# Patient Record
Sex: FEMALE | Race: WHITE | NOT HISPANIC OR LATINO | Employment: UNEMPLOYED | URBAN - METROPOLITAN AREA
[De-identification: names, ages, dates, MRNs, and addresses within clinical notes are randomized per-mention and may not be internally consistent; named-entity substitution may affect disease eponyms.]

---

## 2024-08-01 ENCOUNTER — HOSPITAL ENCOUNTER (EMERGENCY)
Facility: HOSPITAL | Age: 44
Discharge: HOME | End: 2024-08-01
Payer: MEDICAID

## 2024-08-01 VITALS
HEART RATE: 87 BPM | RESPIRATION RATE: 15 BRPM | HEIGHT: 64 IN | DIASTOLIC BLOOD PRESSURE: 88 MMHG | BODY MASS INDEX: 23.56 KG/M2 | OXYGEN SATURATION: 100 % | TEMPERATURE: 98.7 F | WEIGHT: 138 LBS | SYSTOLIC BLOOD PRESSURE: 132 MMHG

## 2024-08-01 DIAGNOSIS — K02.9 PAIN DUE TO DENTAL CARIES: ICD-10-CM

## 2024-08-01 DIAGNOSIS — K04.7 DENTAL ABSCESS: Primary | ICD-10-CM

## 2024-08-01 PROCEDURE — 99283 EMERGENCY DEPT VISIT LOW MDM: CPT

## 2024-08-01 PROCEDURE — 2500000001 HC RX 250 WO HCPCS SELF ADMINISTERED DRUGS (ALT 637 FOR MEDICARE OP)

## 2024-08-01 RX ORDER — HYDROCODONE BITARTRATE AND ACETAMINOPHEN 5; 325 MG/1; MG/1
1 TABLET ORAL EVERY 6 HOURS PRN
Qty: 10 TABLET | Refills: 0 | Status: SHIPPED | OUTPATIENT
Start: 2024-08-01

## 2024-08-01 RX ORDER — HYDROCODONE BITARTRATE AND ACETAMINOPHEN 10; 325 MG/1; MG/1
1 TABLET ORAL ONCE
Status: COMPLETED | OUTPATIENT
Start: 2024-08-01 | End: 2024-08-01

## 2024-08-01 RX ADMIN — HYDROCODONE BITARTRATE AND ACETAMINOPHEN 1 TABLET: 10; 325 TABLET ORAL at 18:08

## 2024-08-01 ASSESSMENT — PAIN - FUNCTIONAL ASSESSMENT: PAIN_FUNCTIONAL_ASSESSMENT: 0-10

## 2024-08-01 ASSESSMENT — COLUMBIA-SUICIDE SEVERITY RATING SCALE - C-SSRS
6. HAVE YOU EVER DONE ANYTHING, STARTED TO DO ANYTHING, OR PREPARED TO DO ANYTHING TO END YOUR LIFE?: NO
1. IN THE PAST MONTH, HAVE YOU WISHED YOU WERE DEAD OR WISHED YOU COULD GO TO SLEEP AND NOT WAKE UP?: NO
2. HAVE YOU ACTUALLY HAD ANY THOUGHTS OF KILLING YOURSELF?: NO

## 2024-08-01 ASSESSMENT — PAIN DESCRIPTION - PAIN TYPE: TYPE: ACUTE PAIN

## 2024-08-01 ASSESSMENT — PAIN SCALES - GENERAL
PAINLEVEL_OUTOF10: 7
PAINLEVEL_OUTOF10: 7

## 2024-08-01 NOTE — ED TRIAGE NOTES
Patient was seen at urgent care yesterday for a infected tooth and started clindamycin.  Patient woke up with worsening symptoms (swelling)     Anesthesia Volume In Cc: 4.1

## 2024-08-01 NOTE — DISCHARGE INSTRUCTIONS
It is important that you follow-up with your oral surgeon for definitive care, please finish all antibiotics even if symptoms improve

## 2024-08-01 NOTE — ED PROVIDER NOTES
Chief Complaint   Patient presents with    Dental Pain       44-year-old female arrives to the emergency department with a chief complaint of a right upper facial swelling secondary to dental abscess.  The patient was seen in the urgent care yesterday, started on clindamycin and an appropriate dose, patient has been taking less than 24 hours of clindamycin, stating that the swelling and the pain has intensified.  The patient has no identifiable drainable abscess on initial assessment, patient has multiple dental caries that appear to be chronic as well as her gumline deteriorating.  Patient is working closely with an oral surgeon, has had multiple root canals that have failed, and currently oral surgeon was scheduled for today and would not see the patient's secondary to the facial swelling and dental abscess.  Patient denies any other symptoms or complaints, patient writhing in obvious discomfort      History provided by:  Patient   used: No         PmHx, PsHx, Allergies, Family Hx, social Hx reviewed as documented    Given the focused nature of the complaint, only related components review of systems were evaluated, and abnormalities indicated in HPI    Physical Exam:    General: Patient in obvious discomfort, is AAOx3, appears well developed, well nourished, is a good historian, answers questions appropriately    HEENT: Multiple dental caries, broken teeth, eroded gumline that all appear to be chronic.  Otherwise head normocephalic, atraumatic, PERRLA, EOMs intact, oropharynx without erythema or exudate, buccal mucosa intact without lesions, TMs unremarkable, nose is patent bilateral    Pulmonary: CTAB, no accessory muscle use, able to speak full clear sentences    Cardiac: HRRR, no murmurs, rubs or gallops    GI: soft, non-tender, non-distended    Musculoskeletal: full weight bearing, HERNANDEZ, no joint effusions, clubbing or edema noted    Skin: intact, no lesions or rashes noted, turgor is  good.    Medical Decision Making  This patient was seen in the emergency department with an attending physician available at all times throughout their ED course    Primary consideration for the patient is control of her pain secondary to her dental abscess of her multiple chronic dental caries and conditions that she is currently seeking definitive care for.  Patient started on clindamycin 300 mg 4 times a day, which is an appropriate dose and an appropriate medication given the patient's presentation.  Patient given Vicodin here in the emergency department for her obvious pain, as well as a's short prescription of Vicodin for breakthrough pain, patient will follow-up with her oral surgeon and understands the need for definitive care.  Given the patient's chronic nature and presentation, it is unlikely for a deep tissue abscess, patient has no swelling in the neck bilaterally, though considered no imaging will be done at this time.  Patient is hemodynamically stable upon arrival.  Through shared decision making no diagnostic blood work will be used to further evaluate for systemic infection    Patient is amenable to the plan of discharge as outlined above, all patient's questions pertaining to their ED course were answered in their entirety.  Strict return precautions were discussed with the patient and they verbalized understanding.  Further, it was made clear to the patient that from an emergent basis, all effort and testing was done to eliminate any imminent dangerous or potentially dangerous conditions of the patient however if their symptoms get much worse or feel life-threatening, they are to return to the emergency department or call 911 immediately.       Diagnoses as of 08/01/24 1807   Dental abscess   Pain due to dental caries       The patient has had the following imaging during this ER visit: None     Patient History   Past Medical History:   Diagnosis Date    Chronic or unspecified gastric ulcer with  "perforation (Multi) 2017    Perforated gastric ulcer    Personal history of pneumonia (recurrent)     History of pneumonia     Past Surgical History:   Procedure Laterality Date    CT ABDOMEN PELVIS ANGIOGRAM W AND/OR WO IV CONTRAST  2022    CT ABDOMEN PELVIS ANGIOGRAM W AND/OR WO IV CONTRAST 2022 [facility] CLINICAL LEGACY    OTHER SURGICAL HISTORY  2020    Knee surgery    OTHER SURGICAL HISTORY  2020     section    US GUIDED ABSCESS DRAIN  2017    US GUIDED ABSCESS DRAIN 2017 [facility] CLINICAL LEGACY    US GUIDED TRANSVAGINAL TRANSRECTAL FLUID DRAIN  2017    US GUIDED TRANSVAGINAL TRANSRECTAL FLUID DRAIN 2017 [facility] CLINICAL LEGACY     No family history on file.  Social History     Tobacco Use    Smoking status: Not on file    Smokeless tobacco: Not on file   Substance Use Topics    Alcohol use: Not on file    Drug use: Not on file       ED Triage Vitals [24 1627]   Temperature Heart Rate Respirations BP   37.1 °C (98.7 °F) 94 16 (!) 138/94      Pulse Ox Temp src Heart Rate Source Patient Position   100 % -- -- --      BP Location FiO2 (%)     -- 21 %       Vitals:    24 1627   BP: (!) 138/94   Pulse: 94   Resp: 16   Temp: 37.1 °C (98.7 °F)   SpO2: 100%   Weight: 62.6 kg (138 lb)   Height: 1.626 m (5' 4\")               JOVANNA Ramirez-CNP  24 1807    "

## 2024-09-18 ENCOUNTER — HOSPITAL ENCOUNTER (EMERGENCY)
Facility: HOSPITAL | Age: 44
Discharge: HOME | End: 2024-09-18
Payer: MEDICAID

## 2024-09-18 VITALS
TEMPERATURE: 97.7 F | RESPIRATION RATE: 16 BRPM | BODY MASS INDEX: 25.27 KG/M2 | WEIGHT: 148 LBS | HEIGHT: 64 IN | SYSTOLIC BLOOD PRESSURE: 120 MMHG | DIASTOLIC BLOOD PRESSURE: 88 MMHG | HEART RATE: 89 BPM | OXYGEN SATURATION: 99 %

## 2024-09-18 DIAGNOSIS — K04.7 DENTAL INFECTION: Primary | ICD-10-CM

## 2024-09-18 LAB
ALBUMIN SERPL BCP-MCNC: 4.2 G/DL (ref 3.4–5)
ALP SERPL-CCNC: 100 U/L (ref 33–110)
ALT SERPL W P-5'-P-CCNC: 8 U/L (ref 7–45)
ANION GAP SERPL CALC-SCNC: 13 MMOL/L (ref 10–20)
AST SERPL W P-5'-P-CCNC: 13 U/L (ref 9–39)
BASOPHILS # BLD AUTO: 0.05 X10*3/UL (ref 0–0.1)
BASOPHILS NFR BLD AUTO: 0.6 %
BILIRUB SERPL-MCNC: 0.2 MG/DL (ref 0–1.2)
BUN SERPL-MCNC: 14 MG/DL (ref 6–23)
CALCIUM SERPL-MCNC: 8.8 MG/DL (ref 8.6–10.3)
CHLORIDE SERPL-SCNC: 106 MMOL/L (ref 98–107)
CO2 SERPL-SCNC: 22 MMOL/L (ref 21–32)
CREAT SERPL-MCNC: 0.76 MG/DL (ref 0.5–1.05)
EGFRCR SERPLBLD CKD-EPI 2021: >90 ML/MIN/1.73M*2
EOSINOPHIL # BLD AUTO: 0.05 X10*3/UL (ref 0–0.7)
EOSINOPHIL NFR BLD AUTO: 0.6 %
ERYTHROCYTE [DISTWIDTH] IN BLOOD BY AUTOMATED COUNT: 15 % (ref 11.5–14.5)
GLUCOSE SERPL-MCNC: 114 MG/DL (ref 74–99)
HCT VFR BLD AUTO: 38.1 % (ref 36–46)
HGB BLD-MCNC: 12.2 G/DL (ref 12–16)
IMM GRANULOCYTES # BLD AUTO: 0.01 X10*3/UL (ref 0–0.7)
IMM GRANULOCYTES NFR BLD AUTO: 0.1 % (ref 0–0.9)
LACTATE SERPL-SCNC: 0.8 MMOL/L (ref 0.4–2)
LYMPHOCYTES # BLD AUTO: 2.14 X10*3/UL (ref 1.2–4.8)
LYMPHOCYTES NFR BLD AUTO: 24.5 %
MCH RBC QN AUTO: 26.9 PG (ref 26–34)
MCHC RBC AUTO-ENTMCNC: 32 G/DL (ref 32–36)
MCV RBC AUTO: 84 FL (ref 80–100)
MONOCYTES # BLD AUTO: 0.72 X10*3/UL (ref 0.1–1)
MONOCYTES NFR BLD AUTO: 8.2 %
NEUTROPHILS # BLD AUTO: 5.76 X10*3/UL (ref 1.2–7.7)
NEUTROPHILS NFR BLD AUTO: 66 %
NRBC BLD-RTO: 0 /100 WBCS (ref 0–0)
PLATELET # BLD AUTO: 394 X10*3/UL (ref 150–450)
POTASSIUM SERPL-SCNC: 3.6 MMOL/L (ref 3.5–5.3)
PROT SERPL-MCNC: 7.2 G/DL (ref 6.4–8.2)
RBC # BLD AUTO: 4.54 X10*6/UL (ref 4–5.2)
SODIUM SERPL-SCNC: 137 MMOL/L (ref 136–145)
WBC # BLD AUTO: 8.7 X10*3/UL (ref 4.4–11.3)

## 2024-09-18 PROCEDURE — 36415 COLL VENOUS BLD VENIPUNCTURE: CPT | Performed by: NURSE PRACTITIONER

## 2024-09-18 PROCEDURE — 96375 TX/PRO/DX INJ NEW DRUG ADDON: CPT

## 2024-09-18 PROCEDURE — 96365 THER/PROPH/DIAG IV INF INIT: CPT

## 2024-09-18 PROCEDURE — 83605 ASSAY OF LACTIC ACID: CPT | Performed by: NURSE PRACTITIONER

## 2024-09-18 PROCEDURE — 84075 ASSAY ALKALINE PHOSPHATASE: CPT | Performed by: NURSE PRACTITIONER

## 2024-09-18 PROCEDURE — 99284 EMERGENCY DEPT VISIT MOD MDM: CPT | Mod: 25

## 2024-09-18 PROCEDURE — 2500000004 HC RX 250 GENERAL PHARMACY W/ HCPCS (ALT 636 FOR OP/ED): Performed by: NURSE PRACTITIONER

## 2024-09-18 PROCEDURE — 96367 TX/PROPH/DG ADDL SEQ IV INF: CPT

## 2024-09-18 PROCEDURE — 85025 COMPLETE CBC W/AUTO DIFF WBC: CPT | Performed by: NURSE PRACTITIONER

## 2024-09-18 PROCEDURE — 96366 THER/PROPH/DIAG IV INF ADDON: CPT

## 2024-09-18 PROCEDURE — 2500000001 HC RX 250 WO HCPCS SELF ADMINISTERED DRUGS (ALT 637 FOR MEDICARE OP): Performed by: NURSE PRACTITIONER

## 2024-09-18 RX ORDER — ONDANSETRON HYDROCHLORIDE 2 MG/ML
4 INJECTION, SOLUTION INTRAVENOUS ONCE
Status: COMPLETED | OUTPATIENT
Start: 2024-09-18 | End: 2024-09-18

## 2024-09-18 RX ORDER — DOXYCYCLINE 100 MG/1
100 CAPSULE ORAL 2 TIMES DAILY
Qty: 20 CAPSULE | Refills: 0 | Status: SHIPPED | OUTPATIENT
Start: 2024-09-18 | End: 2024-09-28

## 2024-09-18 RX ORDER — METRONIDAZOLE 500 MG/100ML
500 INJECTION, SOLUTION INTRAVENOUS ONCE
Status: COMPLETED | OUTPATIENT
Start: 2024-09-18 | End: 2024-09-18

## 2024-09-18 RX ORDER — TRAMADOL HYDROCHLORIDE 50 MG/1
50 TABLET ORAL EVERY 6 HOURS PRN
Qty: 12 TABLET | Refills: 0 | Status: SHIPPED | OUTPATIENT
Start: 2024-09-18 | End: 2024-09-21

## 2024-09-18 RX ORDER — CEFTRIAXONE 1 G/50ML
1 INJECTION, SOLUTION INTRAVENOUS ONCE
Status: COMPLETED | OUTPATIENT
Start: 2024-09-18 | End: 2024-09-18

## 2024-09-18 RX ORDER — TRAMADOL HYDROCHLORIDE 50 MG/1
50 TABLET ORAL EVERY 6 HOURS PRN
Status: DISCONTINUED | OUTPATIENT
Start: 2024-09-18 | End: 2024-09-19 | Stop reason: HOSPADM

## 2024-09-18 RX ORDER — MORPHINE SULFATE 2 MG/ML
2 INJECTION, SOLUTION INTRAMUSCULAR; INTRAVENOUS ONCE
Status: COMPLETED | OUTPATIENT
Start: 2024-09-18 | End: 2024-09-18

## 2024-09-18 ASSESSMENT — PAIN DESCRIPTION - ORIENTATION: ORIENTATION: RIGHT

## 2024-09-18 ASSESSMENT — PAIN SCALES - GENERAL
PAINLEVEL_OUTOF10: 7
PAINLEVEL_OUTOF10: 6
PAINLEVEL_OUTOF10: 8

## 2024-09-18 ASSESSMENT — PAIN DESCRIPTION - PAIN TYPE: TYPE: ACUTE PAIN

## 2024-09-18 ASSESSMENT — LIFESTYLE VARIABLES
HAVE YOU EVER FELT YOU SHOULD CUT DOWN ON YOUR DRINKING: NO
TOTAL SCORE: 0
EVER FELT BAD OR GUILTY ABOUT YOUR DRINKING: NO
EVER HAD A DRINK FIRST THING IN THE MORNING TO STEADY YOUR NERVES TO GET RID OF A HANGOVER: NO
HAVE PEOPLE ANNOYED YOU BY CRITICIZING YOUR DRINKING: NO

## 2024-09-18 ASSESSMENT — COLUMBIA-SUICIDE SEVERITY RATING SCALE - C-SSRS
1. IN THE PAST MONTH, HAVE YOU WISHED YOU WERE DEAD OR WISHED YOU COULD GO TO SLEEP AND NOT WAKE UP?: NO
2. HAVE YOU ACTUALLY HAD ANY THOUGHTS OF KILLING YOURSELF?: NO
6. HAVE YOU EVER DONE ANYTHING, STARTED TO DO ANYTHING, OR PREPARED TO DO ANYTHING TO END YOUR LIFE?: NO

## 2024-09-18 ASSESSMENT — PAIN - FUNCTIONAL ASSESSMENT: PAIN_FUNCTIONAL_ASSESSMENT: 0-10

## 2024-09-18 ASSESSMENT — PAIN DESCRIPTION - LOCATION: LOCATION: MOUTH

## 2024-09-18 ASSESSMENT — PAIN DESCRIPTION - FREQUENCY: FREQUENCY: CONSTANT/CONTINUOUS

## 2024-09-18 NOTE — ED PROVIDER NOTES
HPI   Chief Complaint   Patient presents with    Dental Pain     Patient has been seen for a dental abscess, antibiotic ended yesterday, and today the swelling has increased.        This is a 44-year-old  female presenting to the emergency room with complaints of a dental abscess.  The patient has been having problem with her teeth for years.  She was supposed to get her upper teeth pulled COVID last year but it was postponed because she had to have abdominal surgery.  The patient reports that she has been experiencing pain intermittently in the right upper jaw.  She developed swelling that has gotten progressively worse over the last couple of days.  The patient was seen by the dentist today and she was referred her to oral surgeon.  The patient was sent into the emergency room for IV.  She denies any fevers, chills, nausea, or vomiting.  She states that the swelling is located throughout the cheek and around the eye.  She denies any visual changes or eye trauma.      History provided by:  Patient   used: No            Patient History   Past Medical History:   Diagnosis Date    Chronic or unspecified gastric ulcer with perforation (Multi) 2017    Perforated gastric ulcer    Personal history of pneumonia (recurrent)     History of pneumonia     Past Surgical History:   Procedure Laterality Date    CT ABDOMEN PELVIS ANGIOGRAM W AND/OR WO IV CONTRAST  2022    CT ABDOMEN PELVIS ANGIOGRAM W AND/OR WO IV CONTRAST 2022 [facility] CLINICAL LEGACY    OTHER SURGICAL HISTORY  2020    Knee surgery    OTHER SURGICAL HISTORY  2020     section    US GUIDED ABSCESS DRAIN  2017    US GUIDED ABSCESS DRAIN 2017 [facility] CLINICAL LEGACY    US GUIDED TRANSVAGINAL TRANSRECTAL FLUID DRAIN  2017    US GUIDED TRANSVAGINAL TRANSRECTAL FLUID DRAIN 2017 [facility] CLINICAL LEGACY     No family history on file.  Social History     Tobacco Use    Smoking status: Former      Current packs/day: 0.00     Types: Cigarettes     Quit date:      Years since quittin.7    Smokeless tobacco: Never   Substance Use Topics    Alcohol use: Not Currently    Drug use: Never       Physical Exam   ED Triage Vitals [24 1620]   Temperature Heart Rate Respirations BP   36.5 °C (97.7 °F) (!) 102 16 118/88      Pulse Ox Temp Source Heart Rate Source Patient Position   99 % Tympanic Monitor Sitting      BP Location FiO2 (%)     Left arm --       Physical Exam  Vitals and nursing note reviewed.   HENT:      Head: Normocephalic.      Right Ear: Tympanic membrane and external ear normal.      Left Ear: Tympanic membrane and external ear normal.      Nose: Nose normal.      Mouth/Throat:      Mouth: Mucous membranes are moist.      Pharynx: Oropharynx is clear.      Comments: Patient has very reported dentition.  She has multiple eroded decayed teeth noted to the upper right jawline which is tender to palpation.  Patient has diffuse swelling throughout the right jaw and the lower periorbital region.  No signs of periorbital cellulitis.  Eyes:      Conjunctiva/sclera: Conjunctivae normal.      Pupils: Pupils are equal, round, and reactive to light.   Cardiovascular:      Rate and Rhythm: Normal rate and regular rhythm.      Pulses: Normal pulses.      Heart sounds: Normal heart sounds.   Pulmonary:      Effort: Pulmonary effort is normal.      Breath sounds: Normal breath sounds.   Abdominal:      General: Bowel sounds are normal.      Palpations: Abdomen is soft.   Musculoskeletal:         General: Normal range of motion.      Cervical back: Normal range of motion.   Lymphadenopathy:      Cervical: No cervical adenopathy.   Skin:     General: Skin is warm.      Capillary Refill: Capillary refill takes less than 2 seconds.   Neurological:      General: No focal deficit present.      Mental Status: She is alert.   Psychiatric:         Mood and Affect: Mood normal.           ED Course & MDM   Diagnoses  as of 09/18/24 2152   Dental infection                 No data recorded     [place] Coma Scale Score: 15 (09/18/24 1620 : Germán Lopez, EMT)                           Medical Decision Making  The patient was seen and evaluated by the nurse practitioner, Kelli Fu.  The patient is presenting to the emergency room with complaints of a dental infection and right facial swelling.  On examination the patient has swelling noted to the right side of the face but does not have any evidence of a cellulitic process.  A saline lock was established.  Laboratory studies were drawn with results as noted.  The patient does not have any acute leukocytosis or lactic acidosis.  And the patient was administered ceftriaxone and Flagyl for antibiotic coverage.  She was administered tramadol 50 mg p.o. for pain.  She reported improvement of pain symptoms but still had significant pain she was further medicated with morphine 2 mg IVP and Zofran 4 mg IVP.  The patient was placed on doxycycline for antibiotic coverage at home.  She was provided prescription for Ultram for pain.  She was given a list of area dental clinics for follow-up and definitive care.  The patient was instructed to return if she has any worsening symptomatology after the next 2 to 3 days.  The patient was discharged in stable condition with computer discharge instructions given.  Patient verbalized understanding of discharge planning.        Procedure  Procedures     Kelli Fu, JOVANNA-YFN  09/18/24 2154

## 2025-03-15 ENCOUNTER — OFFICE VISIT (OUTPATIENT)
Dept: URGENT CARE | Age: 45
End: 2025-03-15
Payer: MEDICAID

## 2025-03-15 VITALS
OXYGEN SATURATION: 98 % | HEART RATE: 111 BPM | RESPIRATION RATE: 16 BRPM | TEMPERATURE: 98.9 F | SYSTOLIC BLOOD PRESSURE: 128 MMHG | DIASTOLIC BLOOD PRESSURE: 89 MMHG

## 2025-03-15 DIAGNOSIS — R05.1 ACUTE COUGH: Primary | ICD-10-CM

## 2025-03-15 DIAGNOSIS — J06.9 VIRAL URI: ICD-10-CM

## 2025-03-15 LAB
POC CORONAVIRUS SARS-COV-2 PCR: NEGATIVE
POC HUMAN RHINOVIRUS PCR: NEGATIVE
POC INFLUENZA A VIRUS PCR: NEGATIVE
POC INFLUENZA B VIRUS PCR: NEGATIVE
POC RESPIRATORY SYNCYTIAL VIRUS PCR: NEGATIVE

## 2025-03-15 RX ORDER — DULOXETIN HYDROCHLORIDE 20 MG/1
CAPSULE, DELAYED RELEASE ORAL
COMMUNITY

## 2025-03-15 RX ORDER — BUSPIRONE HYDROCHLORIDE 5 MG/1
5 TABLET ORAL 2 TIMES DAILY
COMMUNITY
Start: 2024-10-29

## 2025-03-15 RX ORDER — ASPIRIN 325 MG
1 TABLET, DELAYED RELEASE (ENTERIC COATED) ORAL
COMMUNITY

## 2025-03-15 NOTE — PROGRESS NOTES
Subjective   Patient ID: Jeanne Thomas is a 44 y.o. female. They present today with a chief complaint of Headache, Nasal Congestion, and Cough for 1 day      Past Medical History  Allergies as of 03/15/2025 - Reviewed 03/15/2025   Allergen Reaction Noted    Penicillins Rash, Anaphylaxis, and Unknown 1982    Nsaids (non-steroidal anti-inflammatory drug) Bleeding and GI bleeding 2021    Wellbutrin [bupropion hcl] Unknown 2024       (Not in a hospital admission)       Past Medical History:   Diagnosis Date    Chronic or unspecified gastric ulcer with perforation 2017    Perforated gastric ulcer    Personal history of pneumonia (recurrent)     History of pneumonia       Past Surgical History:   Procedure Laterality Date    CT ABDOMEN PELVIS ANGIOGRAM W AND/OR WO IV CONTRAST  2022    CT ABDOMEN PELVIS ANGIOGRAM W AND/OR WO IV CONTRAST 2022 [facility] CLINICAL LEGACY    OTHER SURGICAL HISTORY  2020    Knee surgery    OTHER SURGICAL HISTORY  2020     section    US GUIDED ABSCESS DRAIN  2017    US GUIDED ABSCESS DRAIN 2017 [facility] CLINICAL LEGACY    US GUIDED TRANSVAGINAL TRANSRECTAL FLUID DRAIN  2017    US GUIDED TRANSVAGINAL TRANSRECTAL FLUID DRAIN 2017 [facility] CLINICAL LEGACY        reports that she quit smoking about 3 years ago. Her smoking use included cigarettes. She has never used smokeless tobacco. She reports that she does not currently use alcohol. She reports that she does not use drugs.    Review of Systems  Review of Systems                               Objective    Vitals:    03/15/25 1418   BP: 128/89   Pulse: (!) 111   Resp: 16   Temp: 37.2 °C (98.9 °F)   SpO2: 98%     No LMP recorded. Patient has had a hysterectomy.    Physical Exam  Vitals reviewed.   Constitutional:       General: She is not in acute distress.  HENT:      Head: Normocephalic and atraumatic.      Right Ear: Tympanic membrane and ear canal normal. No tenderness.      Left  Ear: Tympanic membrane and ear canal normal. No tenderness.      Nose: Congestion present.      Mouth/Throat:      Mouth: Mucous membranes are moist.      Pharynx: Oropharynx is clear. Uvula midline. No pharyngeal swelling, oropharyngeal exudate or posterior oropharyngeal erythema.   Eyes:      Extraocular Movements: Extraocular movements intact.      Conjunctiva/sclera: Conjunctivae normal.   Cardiovascular:      Rate and Rhythm: Normal rate and regular rhythm.      Heart sounds: No murmur heard.  Pulmonary:      Effort: Pulmonary effort is normal.      Breath sounds: Normal breath sounds. No decreased breath sounds, wheezing, rhonchi or rales.   Skin:     General: Skin is warm.   Neurological:      Mental Status: She is alert and oriented to person, place, and time.   Psychiatric:         Mood and Affect: Mood normal.         Behavior: Behavior normal.             Point of Care Test & Imaging Results from this visit  Results for orders placed or performed in visit on 03/15/25   POCT SPOTFIRE R/ST Panel Mini w/COVID ([facility]) manually resulted    Specimen: Swab   Result Value Ref Range    POC Sars-Cov-2 PCR Negative Negative    POC Respiratory Syncytial Virus PCR Negative Negative    POC Influenza A Virus PCR Negative Negative    POC Influenza B Virus PCR Negative Negative    POC Human Rhinovirus PCR Negative Negative      No results found.    Diagnostic study results (if any) were reviewed by Roberto Orellana PA-C.    Assessment/Plan   Allergies, medications, history, and pertinent labs/EKGs/Imaging reviewed by Roberto Orellana PA-C.     Medical Decision Making  Negative COVID, flu, RSV, and rhinovirus tests.  Recommended DayQuil and NyQuil as needed for symptoms.  -         Patient is educated about their diagnoses.     -          Discussed medications benefits and adverse effects.     -          Answered all patient’s questions.     -          Patient will call 911 or go to the nearest ED if worsen symptoms .      -          Patient is agreeable to the plan of care and is deemed stable upon discharge.     -          Follow up with your primary care provider in two days.    Orders and Diagnoses  Diagnoses and all orders for this visit:  Acute cough  -     POCT SPOTFIRE R/ST Panel Mini w/COVID ([facility]) manually resulted  Viral URI      Medical Admin Record      Patient disposition: Home    Electronically signed by Roberto Orellana PA-C  2:48 PM

## 2025-04-23 ENCOUNTER — APPOINTMENT (OUTPATIENT)
Dept: PRIMARY CARE | Facility: CLINIC | Age: 45
End: 2025-04-23
Payer: MEDICAID

## 2025-04-24 ENCOUNTER — OFFICE VISIT (OUTPATIENT)
Dept: PRIMARY CARE | Facility: CLINIC | Age: 45
End: 2025-04-24
Payer: MEDICAID

## 2025-04-24 VITALS
HEIGHT: 64 IN | HEART RATE: 106 BPM | SYSTOLIC BLOOD PRESSURE: 126 MMHG | BODY MASS INDEX: 28.92 KG/M2 | RESPIRATION RATE: 18 BRPM | OXYGEN SATURATION: 99 % | WEIGHT: 169.4 LBS | TEMPERATURE: 97.3 F | DIASTOLIC BLOOD PRESSURE: 78 MMHG

## 2025-04-24 DIAGNOSIS — K27.9 PUD (PEPTIC ULCER DISEASE): ICD-10-CM

## 2025-04-24 DIAGNOSIS — F15.10: ICD-10-CM

## 2025-04-24 DIAGNOSIS — K21.00 GASTROESOPHAGEAL REFLUX DISEASE WITH ESOPHAGITIS WITHOUT HEMORRHAGE: ICD-10-CM

## 2025-04-24 DIAGNOSIS — Z00.00 GENERAL MEDICAL EXAM: ICD-10-CM

## 2025-04-24 DIAGNOSIS — F41.9 ANXIETY: ICD-10-CM

## 2025-04-24 DIAGNOSIS — F33.9 EPISODE OF RECURRENT MAJOR DEPRESSIVE DISORDER, UNSPECIFIED DEPRESSION EPISODE SEVERITY: Primary | ICD-10-CM

## 2025-04-24 PROCEDURE — 3008F BODY MASS INDEX DOCD: CPT | Performed by: INTERNAL MEDICINE

## 2025-04-24 PROCEDURE — 99386 PREV VISIT NEW AGE 40-64: CPT | Performed by: INTERNAL MEDICINE

## 2025-04-24 PROCEDURE — 1036F TOBACCO NON-USER: CPT | Performed by: INTERNAL MEDICINE

## 2025-04-24 PROCEDURE — 99204 OFFICE O/P NEW MOD 45 MIN: CPT | Performed by: INTERNAL MEDICINE

## 2025-04-24 RX ORDER — QUETIAPINE FUMARATE 25 MG/1
1 TABLET, FILM COATED ORAL NIGHTLY
COMMUNITY
Start: 2025-02-24 | End: 2025-04-24 | Stop reason: ALTCHOICE

## 2025-04-24 RX ORDER — DULOXETIN HYDROCHLORIDE 20 MG/1
20 CAPSULE, DELAYED RELEASE ORAL DAILY
Qty: 30 CAPSULE | Refills: 1 | Status: SHIPPED | OUTPATIENT
Start: 2025-04-24

## 2025-04-24 RX ORDER — LISDEXAMFETAMINE DIMESYLATE 40 MG/1
40 CAPSULE ORAL
COMMUNITY
Start: 2024-09-30

## 2025-04-24 RX ORDER — BUSPIRONE HYDROCHLORIDE 5 MG/1
5 TABLET ORAL 2 TIMES DAILY
Qty: 60 TABLET | Refills: 1 | Status: SHIPPED | OUTPATIENT
Start: 2025-04-24

## 2025-04-24 ASSESSMENT — ENCOUNTER SYMPTOMS
WHEEZING: 0
SLEEP DISTURBANCE: 1
HEADACHES: 0
FEVER: 0
SHORTNESS OF BREATH: 0
ADENOPATHY: 0
DYSURIA: 0
ACTIVITY CHANGE: 0
NUMBNESS: 0
WEAKNESS: 0
PALPITATIONS: 0
ARTHRALGIAS: 0
DIZZINESS: 0
BACK PAIN: 0
COUGH: 0
ENDOCRINE NEGATIVE: 1
JOINT SWELLING: 0
NERVOUS/ANXIOUS: 1
ABDOMINAL PAIN: 0
BLOOD IN STOOL: 0
FREQUENCY: 0
CONSTIPATION: 0
AGITATION: 0
ALLERGIC/IMMUNOLOGIC NEGATIVE: 1
FATIGUE: 0
EYE REDNESS: 0

## 2025-04-24 ASSESSMENT — PATIENT HEALTH QUESTIONNAIRE - PHQ9
SUM OF ALL RESPONSES TO PHQ9 QUESTIONS 1 AND 2: 0
2. FEELING DOWN, DEPRESSED OR HOPELESS: NOT AT ALL
1. LITTLE INTEREST OR PLEASURE IN DOING THINGS: NOT AT ALL

## 2025-04-24 NOTE — PROGRESS NOTES
Subjective   Patient ID: Jeanne Thomas is a 44 y.o. 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   Depression   Gastric outlet obstruction, recurrent    PUD   -   GI , Dr Tripp Berkowitz   S/p laparoscopic hemigastrectomy, Crispin en Y gastrojejunostomy and side-to-side jejunojejunostomy    Leg length discrepancy   Scoliosis   Vitamin D deficiency     PAST SURGICAL HISTORY   Procedure Laterality Date    SECTION HX 2007   x3   EGD 04/15/2024   and 24   EGD 2024   EGD WITH BIOPSY(S) 2022   large bleeding gastric ulcer   EGD WITH BIOPSY(S) 2019   large ulcer at pylorus   Laparoscopic hemigastrectomy with truncal vagotomy and 50 cm Crispin en Y retrocolic longitudinal posterior gastrojejunostomy and side to side jejunojejunostomy   2024 , [facility]  KNEE SURGERY HX Right 1993   PAST SURGICAL HISTORY OF   repair gastric perforation   TOOTH EXTRACTION Left   wisdom teeth   TUBAL LIGATION HX     FAMILY HISTORY   Problem Relation Age of Onset   Hypertension Mother   Melanoma Mother   Heart Mother   heart failure   other (FACTOR EIGHT [Other]) Mother   Hypertension Father   other (FACTOR EIGHT [Other]) Father   other (FACTOR EIGHT [Other]) Sister   other (pseudotumor cerebri) Sister   Hypertension Sister     Social History :  She works in a skilled nursing facility , [email or web address] and studies online , biology   And public health    Tobacco Use   Smoking status: Former   Current packs/day: 0.00   Average packs/day: 0.5 packs/day for 6.0 years (3.0 ttl pk-yrs)   Types: Cigarettes   Start date: 10/2017   Quit date: 10/2023   Years since quittin.0   Smokeless tobacco: Never   Tobacco comments:   3-4 cigarettes a day  , quit  Vaping Use   Vaping status:  "Never Used   Substance Use Topics   Alcohol use: No   Comment: last ETOH 2023   Drug use: Not Currently   Types: Marijuana          Review of Systems   Constitutional:  Negative for activity change, fatigue and fever.   HENT:  Negative for congestion.    Eyes:  Negative for redness and visual disturbance.   Respiratory:  Negative for cough, shortness of breath and wheezing.    Cardiovascular:  Negative for chest pain, palpitations and leg swelling.   Gastrointestinal:  Negative for abdominal pain, blood in stool and constipation.   Endocrine: Negative.    Genitourinary:  Negative for dysuria, frequency and urgency.   Musculoskeletal:  Negative for arthralgias, back pain and joint swelling.   Skin:  Negative for rash.   Allergic/Immunologic: Negative.    Neurological:  Negative for dizziness, weakness, numbness and headaches.   Hematological:  Negative for adenopathy.   Psychiatric/Behavioral:  Positive for sleep disturbance. Negative for agitation and behavioral problems. The patient is nervous/anxious.        Objective   /78 (BP Location: Left arm, Patient Position: Sitting, BP Cuff Size: Adult)   Pulse 106   Temp 36.3 °C (97.3 °F) (Temporal)   Resp 18   Ht 1.626 m (5' 4\")   Wt 76.8 kg (169 lb 6.4 oz)   SpO2 99%   BMI 29.08 kg/m²     Physical Exam  Constitutional:       Appearance: Normal appearance.   HENT:      Head: Normocephalic and atraumatic.      Right Ear: Tympanic membrane and external ear normal.      Left Ear: Tympanic membrane and external ear normal.      Nose: No congestion.      Mouth/Throat:      Mouth: Mucous membranes are moist.      Pharynx: Oropharynx is clear.   Eyes:      Extraocular Movements: Extraocular movements intact.      Conjunctiva/sclera: Conjunctivae normal.      Pupils: Pupils are equal, round, and reactive to light.   Cardiovascular:      Rate and Rhythm: Normal rate and regular rhythm.      Pulses: Normal pulses.      Heart sounds: Normal heart sounds. No murmur " heard.  Pulmonary:      Effort: Pulmonary effort is normal.      Breath sounds: Normal breath sounds. No wheezing or rales.   Abdominal:      General: Abdomen is flat. Bowel sounds are normal.      Palpations: Abdomen is soft.      Hernia: No hernia is present.   Musculoskeletal:         General: No swelling or tenderness. Normal range of motion.      Cervical back: Normal range of motion and neck supple.      Right lower leg: No edema.      Left lower leg: No edema.   Skin:     General: Skin is warm and dry.      Capillary Refill: Capillary refill takes less than 2 seconds.      Findings: No rash.   Neurological:      General: No focal deficit present.      Mental Status: She is alert and oriented to person, place, and time.      Motor: No weakness.      Gait: Gait normal.   Psychiatric:         Mood and Affect: Mood normal.         Behavior: Behavior normal.         Assessment/Plan        Gastric outlet obstruction, recurrent    PUD   -   was seeing GI , Dr Tripp Berkowitz   S/p laparoscopic hemigastrectomy, Crispin en Y gastrojejunostomy and side-to-side jejunojejunostomy    Refer to Dr Dent   She is not taking PPI , says she does not need it   Avoid NSAIDs, sodas , spicy , fried food    MDD - Anxiety and depression :  Buspar 5 mg bid , continue  Duloxetine 20 mg Cap daily  Psychiatry referral , Dr Maximo Noriega    ADHD :  She was taking Vyvanse 30 to 40 mg daily, last fill was 9/30/24 per [facility]  Refer to Psychiatry as above    Vision check  Flushot, in Fall  Covid booster  , she refuses  Gyn check , pelvic / pap and mammogram yearly  See Dr Alma Anderson  Colonoscopy per GI

## 2025-05-12 ENCOUNTER — PHARMACY VISIT (OUTPATIENT)
Dept: PHARMACY | Facility: CLINIC | Age: 45
End: 2025-05-12
Payer: MEDICAID

## 2025-05-12 ENCOUNTER — HOSPITAL ENCOUNTER (EMERGENCY)
Facility: HOSPITAL | Age: 45
Discharge: HOME | End: 2025-05-12
Payer: MEDICAID

## 2025-05-12 VITALS
SYSTOLIC BLOOD PRESSURE: 106 MMHG | DIASTOLIC BLOOD PRESSURE: 74 MMHG | TEMPERATURE: 97.9 F | WEIGHT: 158 LBS | RESPIRATION RATE: 16 BRPM | HEIGHT: 64 IN | OXYGEN SATURATION: 98 % | HEART RATE: 86 BPM | BODY MASS INDEX: 26.98 KG/M2

## 2025-05-12 DIAGNOSIS — K08.89 PAIN, DENTAL: Primary | ICD-10-CM

## 2025-05-12 PROCEDURE — RXMED WILLOW AMBULATORY MEDICATION CHARGE

## 2025-05-12 PROCEDURE — 99284 EMERGENCY DEPT VISIT MOD MDM: CPT

## 2025-05-12 PROCEDURE — 96372 THER/PROPH/DIAG INJ SC/IM: CPT | Performed by: NURSE PRACTITIONER

## 2025-05-12 PROCEDURE — 2500000004 HC RX 250 GENERAL PHARMACY W/ HCPCS (ALT 636 FOR OP/ED): Performed by: NURSE PRACTITIONER

## 2025-05-12 RX ORDER — KETOROLAC TROMETHAMINE 30 MG/ML
15 INJECTION, SOLUTION INTRAMUSCULAR; INTRAVENOUS ONCE
Status: COMPLETED | OUTPATIENT
Start: 2025-05-12 | End: 2025-05-12

## 2025-05-12 RX ORDER — CLINDAMYCIN HYDROCHLORIDE 300 MG/1
300 CAPSULE ORAL 4 TIMES DAILY
Qty: 40 CAPSULE | Refills: 0 | Status: SHIPPED | OUTPATIENT
Start: 2025-05-12 | End: 2025-05-22

## 2025-05-12 RX ADMIN — KETOROLAC TROMETHAMINE 15 MG: 30 INJECTION, SOLUTION INTRAMUSCULAR at 09:43

## 2025-05-12 ASSESSMENT — PAIN DESCRIPTION - LOCATION: LOCATION: TEETH

## 2025-05-12 ASSESSMENT — PAIN - FUNCTIONAL ASSESSMENT: PAIN_FUNCTIONAL_ASSESSMENT: 0-10

## 2025-05-12 ASSESSMENT — PAIN SCALES - GENERAL: PAINLEVEL_OUTOF10: 8

## 2025-05-12 NOTE — ED NOTES
Pt OK for d/c home per provider. All results and findings discussed with patient by provider. Home care and follow up instructions provided to patient. All questions answered. Pt verbalized understanding of all information. Pt A&Ox4, resp easy, skin warm dry and of appropriate color. Departs ED with steady gait.      Gita Roper RN  05/12/25 [id number]

## 2025-05-12 NOTE — ED TRIAGE NOTES
Pt to ED with c/o dental pain. Pt had 2 extractions last week. Pt took tylenol pta. Dentist could not get her in today

## 2025-05-12 NOTE — PROGRESS NOTES
Medication Education     Medication education for Jeanne Thomas was provided to the patient  for the following medication(s):  Clindamycin       Medication education provided by a Pharmacist:  Proper dose, indication, possible ADRs Benefits of taking the medication  Importance of compliance Potential duration of therapy    Identified potential barriers to education:  None    Method(s) of Education:  Verbal    An opportunity to ask questions and receive answers was provided.     Assessment of understanding the patient :  2= meets goals/outcomes    Additional Notes (if applicable):     Kristin Roberts, PharmD

## 2025-05-12 NOTE — ED PROVIDER NOTES
Chief Complaint   Patient presents with    Dental Pain       HPI       44 year old female presents to the Emergency Department today complaining of right upper jaw pain that she describes as throbbing in nature, constant since having 2 dental extractions last Wednesday, non-radiating, and varies in intensity. Denies any associated fever, chills, headache, neck pain, chest pain, shortness of breath, abdominal pain, nausea, vomiting, diarrhea, constipation, or urinary symptoms.       History provided by:  Patient             Patient History   Medical History[1]  Surgical History[2]  Family History[3]  Social History[4]        Physical Exam  Constitutional:       Appearance: Normal appearance.   HENT:      Head: Normocephalic.      Right Ear: External ear normal.      Left Ear: External ear normal.      Nose: Nose normal.      Mouth/Throat:      Lips: Pink.      Mouth: Mucous membranes are moist.      Dentition: Dental caries present. No dental abscesses.      Pharynx: Oropharynx is clear. Uvula midline. No oropharyngeal exudate or posterior oropharyngeal erythema.      Tonsils: No tonsillar exudate. 0 on the right. 0 on the left.     Eyes:      Conjunctiva/sclera: Conjunctivae normal.      Pupils: Pupils are equal, round, and reactive to light.   Cardiovascular:      Rate and Rhythm: Normal rate and regular rhythm.      Heart sounds: No murmur heard.     No friction rub. No gallop.   Pulmonary:      Effort: Pulmonary effort is normal. No respiratory distress.      Breath sounds: Normal breath sounds. No stridor. No wheezing, rhonchi or rales.   Neurological:      Mental Status: She is alert.         Labs Reviewed - No data to display    No orders to display            ED Course & MDM   Diagnoses as of 05/12/25 0932   Pain, dental           Medical Decision Making  Patient was seen and evaluated by myself. There are no obvious signs of infection and no definitive abscess. Given an injection of Toradol for the pain  here. Avoid any further NSAIDs secondary to her history of ulcers.  Take Tylenol over the counter as needed for pain. No contraindications to NSAIDs are noted. Prophylacticaly treated with Clindamycin secondary to Penicillin allergy. Follow up with their dentist tomorrow as planned. Return if worse in any way. Discharged in stable condition with computer instructions.    Diagnostic Impression:     1. Acute odontalgia    2. Prescription therapy            Your medication list        ASK your doctor about these medications        Instructions Last Dose Given Next Dose Due   busPIRone 5 mg tablet  Commonly known as: Buspar      Take 1 tablet (5 mg) by mouth 2 times a day.       cholecalciferol 1.25 mg (50,000 units) capsule  Commonly known as: Vitamin D-3           DULoxetine 20 mg DR capsule  Commonly known as: Cymbalta      Take 1 capsule (20 mg) by mouth once daily. Do not crush or chew.       lisdexamfetamine 40 mg capsule  Commonly known as: Vyvanse                      Procedure  Procedures       [1]   Past Medical History:  Diagnosis Date    Chronic or unspecified gastric ulcer with perforation 2017    Perforated gastric ulcer    Personal history of pneumonia (recurrent)     History of pneumonia   [2]   Past Surgical History:  Procedure Laterality Date    CT ABDOMEN PELVIS ANGIOGRAM W AND/OR WO IV CONTRAST  2022    CT ABDOMEN PELVIS ANGIOGRAM W AND/OR WO IV CONTRAST 2022 [facility] CLINICAL LEGACY    OTHER SURGICAL HISTORY  2020    Knee surgery    OTHER SURGICAL HISTORY  2020     section    US GUIDED ABSCESS DRAIN  2017    US GUIDED ABSCESS DRAIN 2017 [facility] CLINICAL LEGACY    US GUIDED TRANSVAGINAL TRANSRECTAL FLUID DRAIN  2017    US GUIDED TRANSVAGINAL TRANSRECTAL FLUID DRAIN 2017 [facility] CLINICAL LEGACY   [3] No family history on file.  [4]   Social History  Tobacco Use    Smoking status: Former     Current packs/day: 0.00     Types: Cigarettes     Quit date:       Years since quitting: 3.3    Smokeless tobacco: Never   Vaping Use    Vaping status: Never Used   Substance Use Topics    Alcohol use: Not Currently    Drug use: Never        JOVANNA Sandoval-YFN  05/12/25 0905

## 2025-06-08 ENCOUNTER — OFFICE VISIT (OUTPATIENT)
Dept: URGENT CARE | Age: 45
End: 2025-06-08
Payer: MEDICAID

## 2025-06-08 VITALS
WEIGHT: 158 LBS | SYSTOLIC BLOOD PRESSURE: 121 MMHG | TEMPERATURE: 98.2 F | HEART RATE: 54 BPM | RESPIRATION RATE: 16 BRPM | BODY MASS INDEX: 27.12 KG/M2 | DIASTOLIC BLOOD PRESSURE: 84 MMHG

## 2025-06-08 DIAGNOSIS — K08.89 PAIN, DENTAL: Primary | ICD-10-CM

## 2025-06-08 PROCEDURE — 99213 OFFICE O/P EST LOW 20 MIN: CPT

## 2025-06-08 PROCEDURE — 1036F TOBACCO NON-USER: CPT

## 2025-06-08 RX ORDER — DOXYCYCLINE 100 MG/1
100 CAPSULE ORAL 2 TIMES DAILY
Qty: 14 CAPSULE | Refills: 0 | Status: SHIPPED | OUTPATIENT
Start: 2025-06-08 | End: 2025-06-15

## 2025-06-08 RX ORDER — DULOXETIN HYDROCHLORIDE 20 MG/1
CAPSULE, DELAYED RELEASE ORAL
COMMUNITY

## 2025-06-08 RX ORDER — PREDNISONE 20 MG/1
40 TABLET ORAL DAILY
Qty: 10 TABLET | Refills: 0 | Status: SHIPPED | OUTPATIENT
Start: 2025-06-08 | End: 2025-06-13

## 2025-06-08 RX ORDER — DULOXETIN HYDROCHLORIDE 60 MG/1
CAPSULE, DELAYED RELEASE ORAL
COMMUNITY

## 2025-06-08 ASSESSMENT — ENCOUNTER SYMPTOMS
FACIAL SWELLING: 1
LOSS OF SENSATION IN FEET: 0
OCCASIONAL FEELINGS OF UNSTEADINESS: 0
DEPRESSION: 0
CONSTITUTIONAL NEGATIVE: 1

## 2025-06-08 ASSESSMENT — PATIENT HEALTH QUESTIONNAIRE - PHQ9
2. FEELING DOWN, DEPRESSED OR HOPELESS: NOT AT ALL
SUM OF ALL RESPONSES TO PHQ9 QUESTIONS 1 AND 2: 0
1. LITTLE INTEREST OR PLEASURE IN DOING THINGS: NOT AT ALL

## 2025-06-08 NOTE — PROGRESS NOTES
Subjective   Patient ID: Jeanne Thomas is a 45 y.o. female. They present today with a chief complaint of Dental Pain (Swollen jaw and top lip from opening of tooth extraction).    History of Present Illness  45-year-old female presents to clinic today with complaints of left upper dental pain.  Patient states Tuesday she had all of her upper teeth extracted.  She states that they noticed a sinus from her tooth into her maxillary sinus.  She states that a couple days after the procedure she developed pain and swelling.  She denies fevers.  Denies chills.  She is on clindamycin currently.      Dental Pain  Associated symptoms: facial swelling        Past Medical History  Allergies as of 06/08/2025 - Reviewed 06/08/2025   Allergen Reaction Noted    Penicillins Rash, Anaphylaxis, and Unknown 03/02/1982    Nsaids (non-steroidal anti-inflammatory drug) Bleeding and GI bleeding 05/21/2021    Wellbutrin [bupropion hcl] Unknown 08/01/2024       Prescriptions Prior to Admission[1]     Medical History[2]    Surgical History[3]     reports that she quit smoking about 3 years ago. Her smoking use included cigarettes. She has never used smokeless tobacco. She reports that she does not currently use alcohol. She reports that she does not use drugs.    Review of Systems  Review of Systems   Constitutional: Negative.    HENT:  Positive for dental problem and facial swelling.                                   Objective    Vitals:    06/08/25 1406   BP: 121/84   Pulse: 54   Resp: 16   Temp: 36.8 °C (98.2 °F)   TempSrc: Oral   Weight: 71.7 kg (158 lb)     No LMP recorded. Patient has had a hysterectomy.    Physical Exam  Constitutional:       Appearance: Normal appearance.   HENT:      Mouth/Throat:      Dentition: Dental tenderness and gingival swelling present. No dental abscesses.     Neurological:      Mental Status: She is alert.         Procedures    Point of Care Test & Imaging Results from this visit  No results found for  this visit on 25.   Imaging  No results found.    Cardiology, Vascular, and Other Imaging  No other imaging results found for the past 2 days      Diagnostic study results (if any) were reviewed by Christopher Alejandre PA-C.    Assessment/Plan   Allergies, medications, history, and pertinent labs/EKGs/Imaging reviewed by Christopher Alejandre PA-C.     Medical Decision Making  Patient pleasant cooperative on examination.    On examination there is some partial teeth to the left upper molars.  There is some swelling to the gums in that area.    I am concerned for an ongoing infection with the recent procedure.  I did switch her medication from clindamycin to doxycycline.  I also started her on prednisone.  I advised her to continue with Tylenol as well as icing over the area.    Follow-up with her appointment on Tuesday and if anything were to worsen in the meantime please go to emergency room for further evaluation.    Patient agreement with plan.  Patient alert and oriented, no acute distress upon discharge.    Orders and Diagnoses  There are no diagnoses linked to this encounter.    Medical Admin Record      Patient disposition: Home    Electronically signed by Christopher Alejandre PA-C  2:17 PM           [1] (Not in a hospital admission)  [2]   Past Medical History:  Diagnosis Date    Chronic or unspecified gastric ulcer with perforation 2017    Perforated gastric ulcer    Personal history of pneumonia (recurrent)     History of pneumonia   [3]   Past Surgical History:  Procedure Laterality Date    CT ABDOMEN PELVIS ANGIOGRAM W AND/OR WO IV CONTRAST  2022    CT ABDOMEN PELVIS ANGIOGRAM W AND/OR WO IV CONTRAST 2022 [facility] CLINICAL LEGACY    OTHER SURGICAL HISTORY  2020    Knee surgery    OTHER SURGICAL HISTORY  2020     section    US GUIDED ABSCESS DRAIN  2017    US GUIDED ABSCESS DRAIN 2017 [facility] CLINICAL LEGACY    US GUIDED TRANSVAGINAL TRANSRECTAL FLUID DRAIN  2017    US  GUIDED TRANSVAGINAL TRANSRECTAL FLUID DRAIN 5/18/2017 [facility] CLINICAL LEGACY

## 2025-06-19 DIAGNOSIS — F41.9 ANXIETY: ICD-10-CM

## 2025-06-19 DIAGNOSIS — F33.9 EPISODE OF RECURRENT MAJOR DEPRESSIVE DISORDER, UNSPECIFIED DEPRESSION EPISODE SEVERITY: ICD-10-CM

## 2025-06-19 RX ORDER — DULOXETIN HYDROCHLORIDE 20 MG/1
20 CAPSULE, DELAYED RELEASE ORAL DAILY
Qty: 30 CAPSULE | Refills: 1 | OUTPATIENT
Start: 2025-06-19

## 2025-06-19 NOTE — TELEPHONE ENCOUNTER
You are following with Family Medicine, and you were referred to Psychiatrist , Dr Maximo Noriega  in [address] Check with them

## 2025-06-25 DIAGNOSIS — Z12.31 ENCOUNTER FOR SCREENING MAMMOGRAM FOR BREAST CANCER: ICD-10-CM

## 2025-08-26 ENCOUNTER — APPOINTMENT (OUTPATIENT)
Dept: BEHAVIORAL HEALTH | Facility: CLINIC | Age: 45
End: 2025-08-26
Payer: MEDICAID